# Patient Record
Sex: MALE | Race: WHITE | Employment: PART TIME | URBAN - METROPOLITAN AREA
[De-identification: names, ages, dates, MRNs, and addresses within clinical notes are randomized per-mention and may not be internally consistent; named-entity substitution may affect disease eponyms.]

---
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
## 2020-12-21 ENCOUNTER — HOSPITAL ENCOUNTER (EMERGENCY)
Facility: CLINIC | Age: 20
Discharge: HOME | End: 2020-12-21
Attending: EMERGENCY MEDICINE | Admitting: EMERGENCY MEDICINE
Payer: COMMERCIAL

## 2020-12-21 VITALS
OXYGEN SATURATION: 99 % | HEART RATE: 82 BPM | RESPIRATION RATE: 16 BRPM | DIASTOLIC BLOOD PRESSURE: 82 MMHG | SYSTOLIC BLOOD PRESSURE: 146 MMHG | TEMPERATURE: 98.1 F | HEIGHT: 69 IN | WEIGHT: 190 LBS | BODY MASS INDEX: 28.14 KG/M2

## 2020-12-21 DIAGNOSIS — R45.1 AGITATION: ICD-10-CM

## 2020-12-21 DIAGNOSIS — R45.851 SUICIDAL IDEATION: ICD-10-CM

## 2020-12-21 DIAGNOSIS — F10.929 ACUTE ALCOHOLIC INTOXICATION WITH COMPLICATION (H): ICD-10-CM

## 2020-12-21 LAB
ALBUMIN SERPL-MCNC: 4.4 G/DL (ref 3.4–5)
ALP SERPL-CCNC: 101 U/L (ref 40–150)
ALT SERPL W P-5'-P-CCNC: 30 U/L (ref 0–70)
ANION GAP SERPL CALCULATED.3IONS-SCNC: 7 MMOL/L (ref 3–14)
APAP SERPL-MCNC: <2 MG/L (ref 10–20)
AST SERPL W P-5'-P-CCNC: 22 U/L (ref 0–45)
BASOPHILS # BLD AUTO: 0 10E9/L (ref 0–0.2)
BASOPHILS NFR BLD AUTO: 0.5 %
BILIRUB SERPL-MCNC: 0.2 MG/DL (ref 0.2–1.3)
BUN SERPL-MCNC: 14 MG/DL (ref 7–30)
CALCIUM SERPL-MCNC: 9.2 MG/DL (ref 8.5–10.1)
CHLORIDE SERPL-SCNC: 107 MMOL/L (ref 94–109)
CO2 SERPL-SCNC: 27 MMOL/L (ref 20–32)
CREAT SERPL-MCNC: 0.8 MG/DL (ref 0.66–1.25)
DIFFERENTIAL METHOD BLD: NORMAL
EOSINOPHIL # BLD AUTO: 0.6 10E9/L (ref 0–0.7)
EOSINOPHIL NFR BLD AUTO: 6.7 %
ERYTHROCYTE [DISTWIDTH] IN BLOOD BY AUTOMATED COUNT: 12.4 % (ref 10–15)
ETHANOL SERPL-MCNC: 0.23 G/DL
GFR SERPL CREATININE-BSD FRML MDRD: >90 ML/MIN/{1.73_M2}
GLUCOSE SERPL-MCNC: 106 MG/DL (ref 70–99)
HCT VFR BLD AUTO: 44.5 % (ref 40–53)
HGB BLD-MCNC: 15.5 G/DL (ref 13.3–17.7)
IMM GRANULOCYTES # BLD: 0 10E9/L (ref 0–0.4)
IMM GRANULOCYTES NFR BLD: 0.4 %
INTERPRETATION ECG - MUSE: NORMAL
LYMPHOCYTES # BLD AUTO: 4.8 10E9/L (ref 0.8–5.3)
LYMPHOCYTES NFR BLD AUTO: 56.6 %
MCH RBC QN AUTO: 30.7 PG (ref 26.5–33)
MCHC RBC AUTO-ENTMCNC: 34.8 G/DL (ref 31.5–36.5)
MCV RBC AUTO: 88 FL (ref 78–100)
MONOCYTES # BLD AUTO: 0.5 10E9/L (ref 0–1.3)
MONOCYTES NFR BLD AUTO: 5.4 %
NEUTROPHILS # BLD AUTO: 2.6 10E9/L (ref 1.6–8.3)
NEUTROPHILS NFR BLD AUTO: 30.4 %
NRBC # BLD AUTO: 0 10*3/UL
NRBC BLD AUTO-RTO: 0 /100
PLATELET # BLD AUTO: 262 10E9/L (ref 150–450)
POTASSIUM SERPL-SCNC: 3.4 MMOL/L (ref 3.4–5.3)
PROT SERPL-MCNC: 8.2 G/DL (ref 6.8–8.8)
RBC # BLD AUTO: 5.05 10E12/L (ref 4.4–5.9)
SALICYLATES SERPL-MCNC: <2 MG/DL
SODIUM SERPL-SCNC: 141 MMOL/L (ref 133–144)
TROPONIN I SERPL-MCNC: <0.015 UG/L (ref 0–0.04)
TSH SERPL DL<=0.005 MIU/L-ACNC: 2.54 MU/L (ref 0.4–4)
WBC # BLD AUTO: 8.4 10E9/L (ref 4–11)

## 2020-12-21 PROCEDURE — 84484 ASSAY OF TROPONIN QUANT: CPT | Performed by: EMERGENCY MEDICINE

## 2020-12-21 PROCEDURE — 258N000003 HC RX IP 258 OP 636: Performed by: EMERGENCY MEDICINE

## 2020-12-21 PROCEDURE — 96361 HYDRATE IV INFUSION ADD-ON: CPT

## 2020-12-21 PROCEDURE — 96374 THER/PROPH/DIAG INJ IV PUSH: CPT

## 2020-12-21 PROCEDURE — 80329 ANALGESICS NON-OPIOID 1 OR 2: CPT | Performed by: EMERGENCY MEDICINE

## 2020-12-21 PROCEDURE — 93005 ELECTROCARDIOGRAM TRACING: CPT

## 2020-12-21 PROCEDURE — 85025 COMPLETE CBC W/AUTO DIFF WBC: CPT | Performed by: EMERGENCY MEDICINE

## 2020-12-21 PROCEDURE — 84443 ASSAY THYROID STIM HORMONE: CPT | Performed by: EMERGENCY MEDICINE

## 2020-12-21 PROCEDURE — 80053 COMPREHEN METABOLIC PANEL: CPT | Performed by: EMERGENCY MEDICINE

## 2020-12-21 PROCEDURE — 99285 EMERGENCY DEPT VISIT HI MDM: CPT | Mod: 25

## 2020-12-21 PROCEDURE — 250N000011 HC RX IP 250 OP 636: Performed by: EMERGENCY MEDICINE

## 2020-12-21 PROCEDURE — 90791 PSYCH DIAGNOSTIC EVALUATION: CPT

## 2020-12-21 PROCEDURE — 80320 DRUG SCREEN QUANTALCOHOLS: CPT | Performed by: EMERGENCY MEDICINE

## 2020-12-21 RX ORDER — DROPERIDOL 2.5 MG/ML
10 INJECTION, SOLUTION INTRAMUSCULAR; INTRAVENOUS ONCE
Status: COMPLETED | OUTPATIENT
Start: 2020-12-21 | End: 2020-12-21

## 2020-12-21 RX ADMIN — DROPERIDOL 10 MG: 2.5 INJECTION, SOLUTION INTRAMUSCULAR; INTRAVENOUS at 03:09

## 2020-12-21 RX ADMIN — SODIUM CHLORIDE 1000 ML: 9 INJECTION, SOLUTION INTRAVENOUS at 03:09

## 2020-12-21 ASSESSMENT — MIFFLIN-ST. JEOR: SCORE: 1862.21

## 2020-12-21 NOTE — ED TRIAGE NOTES
Pt recently had a breakup with his girlfriend, tonight after drinking 1.5L of Vodka. Pt called 911 for help. Pt states he is feeling suicidal with any will use any cutting means to kill himself. Pt is on police hold.

## 2020-12-21 NOTE — ED NOTES
Bed: [facility]  Expected date:   Expected time:   Means of arrival:   Comments:  RTM Room 21

## 2020-12-21 NOTE — ED NOTES
Bed: ED21  Expected date:   Expected time:   Means of arrival:   Comments:  534  20M Suicidal  5051

## 2020-12-21 NOTE — ED PROVIDER NOTES
"  History   Chief Complaint:  Suicidal Ideations    History is limited secondary to alcohol intoxication.    HPI   Joel Skinner is a 20 year old male, who presents to the ED for evaluation of suicidal ideations. The patient reports he had broken up with his significant other tonight, became depressed and felt suicidal. As this was ongoing, the patient said he consumed about 1 liter of vodka and thought about cutting his wrists, but did not do so. The patient called EMS stating he felt suicidal and was brought in on a PD hold. Here in the emergency department, the patient feels short of breath and has central chest pain. He is hyperventilating and generally feels unwell. He denies any other substance use tonight.     Allergies:  No known drug allergies    Medications:    Aderall    Past Medical History:    ADHD    Past Surgical History:    The patient denies past surgical history.     Family History:    The patient denies past family history.     Social History:  Smoking status: Never  Alcohol use: No  PCP: Fran Magana  Presents to the ED via EMS  Marital Status:  Single [1]    Review of Systems   Reason unable to perform ROS: Alcohol Intoxication.     Physical Exam     Patient Vitals for the past 24 hrs:   BP Temp Temp src Pulse Resp SpO2 Height Weight   12/21/20 0430 111/60 -- -- 75 17 92 % -- --   12/21/20 0400 116/53 -- -- 79 17 91 % -- --   12/21/20 0320 -- -- -- 83 13 -- -- --   12/21/20 0310 -- -- -- 83 9 -- -- --   12/21/20 0210 -- -- -- -- -- -- 1.753 m (5' 9\") 86.2 kg (190 lb)   12/21/20 0155 138/73 98.1  F (36.7  C) Oral 88 20 -- -- --       Physical Exam  General: Intoxicated, agitated.  Pacing the room stating he's going to kill himself. Uncooperative.    In mild distress  HEENT:  Head:  Atraumatic  Ears:  External ears are normal  Mouth/Throat:  Oropharynx is without erythema or exudate and mucous membranes are moist.   Eyes:   Conjunctivae normal and EOM are normal. No scleral " icterus.  CV:  Normal rate, regular rhythm, normal heart sounds and radial pulses are 2+ and symmetric.  No murmur.  Resp:  Breath sounds are clear bilaterally    Non-labored, no retractions or accessory muscle use  GI:  Abdomen is soft, no distension, no tenderness. No rebound or guarding.  No CVA tenderness bilaterally  MS:  Normal range of motion. No edema.    Normal strength in all 4 extremities.     Back atraumatic.    No midline cervical, thoracic, or lumbar tenderness  Skin:  Warm and dry.  No rash or lesions noted.  Neuro: Altered, intoxicated.  GCS:15  Psych:  Agitation, intoxicated.  Unable to appropriately evaluate.     Emergency Department Course   ECG (02:52:35):  Rate 89 bpm. GA interval 132. QRS duration 102. QT/QTc 360/438. P-R-T axes 50 57 16. Normal sinus rhythm. Normal ECG. Interpreted by Paul Delgado MD.    Laboratory:    CBC: WNL (WBC 8.4, HGB 15.5, )    CMP:  (H), o/w WNL (Creatinine 0.80)    Troponin (Collected 0302): <0.015    TSH with free T4 reflex: 2.54    Alcohol ethyl: 0.23 (H)    Acetaminophen Level: <2    Salicylate Level: <2     Interventions:  0309: NS 1L IV Bolus   0309: Droperidol 10 mg IV    Emergency Department Course:  Reviewed:  I reviewed the patient's nursing notes, vitals, and available past medical records.     Assessments:  0300: I rechecked the patient. Patient is currently sleeping.  0600: I rechecked the patient. Explained findings to patient.    Disposition:  Care of the patient was transferred to my colleague Dr. Rust, pending sober reevaluation and DEC consult.     Impression & Plan   Medical Decision Making:  Joel Skinner is a  20-year-old male who presents after calling 911 due to concerns for potential self-harm and suicidal ideation.  Patient is significantly intoxicated on arrival and seems quite agitated as he is pacing the room, seems to be stretching and/or doing exercises in the room.  He describes to me that he is having chest pain and  feels like he is going to hurt himself.  I do have concern that patient may be a danger both to self as well as our staff in the emergency department, but was ultimately able to encourage him to receive an IV to obtain blood work and also receive a dose of IV droperidol.  Patient has been appropriately sobering while under my care.  He did not require restraints while here in the emergency department. The patient's overdose work up was negative here today and I do not see any signs of co-ingestion here today. I do have high concern for his suicidal thoughts, although due to his active intoxication unable to fully assess his mental health at this time.  Patient care signed out to my partner Dr. Rust pending clinical sobriety and mental health reassessment.  I suspect patient will likely require DEC assessment prior to disposition.  Signed out to Dr. Rust.    Diagnosis:    ICD-10-CM    1. Acute alcoholic intoxication with complication (H)  F10.929    2. Agitation  R45.1    3. Suicidal ideation  R45.851        Disposition:  Signed out to Dr. Rust, pending sober reevaluation and DEC consult.    Scribe Disclosure:  I, Abhishek Taveras, am serving as a scribe at 2:36 AM on 12/21/2020 to document services personally performed by Paul Delgado MD based on my observations and the provider's statements to me.        Paul Delgado MD  12/21/20 1829
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
## 2020-12-21 NOTE — ED NOTES
Pt in his room again.  Pt calm and cooperative.  Took vital signs and gave him a blanket.  Pt says he is constipated.  He is refusing anything to help with that.

## 2020-12-21 NOTE — ED AVS SNAPSHOT
[facility]  [address] [address]  Phone: [phone]  Fax: [phone]                                    Joel Skinner   MRN: [id number]    Department: [facility]   Date of Visit: 12/21/2020           After Visit Summary Signature Page    I have received my discharge instructions, and my questions have been answered. I have discussed any challenges I see with this plan with the nurse or doctor.    ..........................................................................................................................................  Patient/Patient Representative Signature      ..........................................................................................................................................  Patient Representative Print Name and Relationship to Patient    ..................................................               ................................................  Date                                   Time    ..........................................................................................................................................  Reviewed by Signature/Title    ...................................................              ..............................................  Date                                               Time          22EPIC Rev 08/18

## 2023-05-02 NOTE — ED NOTES
"Pt observed pacing in room. Requested several large glasses of water. Observed pt exercising in room, stretching and walking around. Ambulation steady. After given water, pt chugged water and started taking very deep breaths at a rapid rate. Pt could not tell staff why he was doing so. Pt then started having very dramatic purposeful movements such as thrashing himself onto the bed, jumping up suddenly deep breathing, pacing and chugging more water. Attempts to calm pt were unsuccessful. Upon MD arrival, pt sat in bed and would puff out chest and increased his rate of breathing. While doing the ekg pt would intentionally hyperventilate then fake pass out, not breathe for 10-15 seconds then depp inhale, acting as if he was waking up. Pt needed much redirection. Pt repeated this behavior about 5 or 6 times and eventually told that he needs to stop fake fainting, another episode was not observed, however then pt became tearful with occasional outbursts of laughter. Pt made tearful comments to staff \"I just don't want to hurt anybody\", and elaborated \"I just have so much energy\".   " no chest pain, no cough, and no shortness of breath.